# Patient Record
Sex: MALE | Employment: OTHER | URBAN - METROPOLITAN AREA
[De-identification: names, ages, dates, MRNs, and addresses within clinical notes are randomized per-mention and may not be internally consistent; named-entity substitution may affect disease eponyms.]

---

## 2023-06-21 ENCOUNTER — OFFICE VISIT (OUTPATIENT)
Dept: PRIMARY CARE | Facility: CLINIC | Age: 43
End: 2023-06-21
Payer: COMMERCIAL

## 2023-06-21 VITALS
SYSTOLIC BLOOD PRESSURE: 115 MMHG | HEART RATE: 99 BPM | TEMPERATURE: 98 F | DIASTOLIC BLOOD PRESSURE: 82 MMHG | WEIGHT: 242.4 LBS

## 2023-06-21 DIAGNOSIS — R06.83 SNORING: Primary | ICD-10-CM

## 2023-06-21 DIAGNOSIS — Z13.1 DIABETES MELLITUS SCREENING: ICD-10-CM

## 2023-06-21 DIAGNOSIS — J30.9 ALLERGIC RHINITIS, UNSPECIFIED SEASONALITY, UNSPECIFIED TRIGGER: ICD-10-CM

## 2023-06-21 DIAGNOSIS — Z13.220 SCREENING CHOLESTEROL LEVEL: ICD-10-CM

## 2023-06-21 DIAGNOSIS — M79.671 RIGHT FOOT PAIN: ICD-10-CM

## 2023-06-21 DIAGNOSIS — R63.5 ABNORMAL WEIGHT GAIN: ICD-10-CM

## 2023-06-21 PROCEDURE — 99204 OFFICE O/P NEW MOD 45 MIN: CPT | Performed by: FAMILY MEDICINE

## 2023-06-21 PROCEDURE — 1036F TOBACCO NON-USER: CPT | Performed by: FAMILY MEDICINE

## 2023-06-21 RX ORDER — ATOVAQUONE AND PROGUANIL HYDROCHLORIDE 250; 100 MG/1; MG/1
TABLET, FILM COATED ORAL
COMMUNITY
Start: 2023-04-17

## 2023-06-21 RX ORDER — FLUTICASONE PROPIONATE 50 MCG
1 SPRAY, SUSPENSION (ML) NASAL DAILY
Qty: 16 G | Refills: 5 | Status: SHIPPED | OUTPATIENT
Start: 2023-06-21 | End: 2024-06-20

## 2023-06-21 RX ORDER — CIPROFLOXACIN 500 MG/1
TABLET ORAL
COMMUNITY
Start: 2023-04-17

## 2023-06-21 NOTE — PROGRESS NOTES
Subjective   Patient ID: Saul Osman is a 42 y.o. male who presents for Establish Care.  HPI    The patient is here for the management of:  1-Snoring and apneic episodes management.  2- Right foot dorsum treatment.  3- Weight gain management.     A review of system was completed.  All systems were reviewed and were normal, except for the ones that are listed in the HPI.    Objective   Physical Exam    Assessment/Plan   Problem List Items Addressed This Visit          Respiratory    Snoring - Primary    Relevant Medications    fluticasone (Flonase) 50 mcg/actuation nasal spray    Other Relevant Orders    In-Center Sleep Study (Non-Sleep Provider Only)    TSH with reflex to Free T4 if abnormal       Musculoskeletal    Right foot pain    Relevant Orders    XR foot right 1-2 views    Referral to Podiatry       Endocrine/Metabolic    Abnormal weight gain     Other Visit Diagnoses       Allergic rhinitis, unspecified seasonality, unspecified trigger        Relevant Medications    fluticasone (Flonase) 50 mcg/actuation nasal spray    Other Relevant Orders    CBC    Comprehensive Metabolic Panel    Screening cholesterol level        Relevant Orders    Lipid Panel    Diabetes mellitus screening        Relevant Orders    Hemoglobin A1C

## 2023-06-26 ENCOUNTER — LAB (OUTPATIENT)
Dept: LAB | Facility: LAB | Age: 43
End: 2023-06-26
Payer: COMMERCIAL

## 2023-06-26 DIAGNOSIS — Z13.220 SCREENING CHOLESTEROL LEVEL: ICD-10-CM

## 2023-06-26 DIAGNOSIS — R06.83 SNORING: ICD-10-CM

## 2023-06-26 DIAGNOSIS — Z13.1 DIABETES MELLITUS SCREENING: ICD-10-CM

## 2023-06-26 DIAGNOSIS — J30.9 ALLERGIC RHINITIS, UNSPECIFIED SEASONALITY, UNSPECIFIED TRIGGER: ICD-10-CM

## 2023-06-26 LAB
ALANINE AMINOTRANSFERASE (SGPT) (U/L) IN SER/PLAS: 24 U/L (ref 10–52)
ALBUMIN (G/DL) IN SER/PLAS: 4.7 G/DL (ref 3.4–5)
ALKALINE PHOSPHATASE (U/L) IN SER/PLAS: 49 U/L (ref 33–120)
ANION GAP IN SER/PLAS: 13 MMOL/L (ref 10–20)
ASPARTATE AMINOTRANSFERASE (SGOT) (U/L) IN SER/PLAS: 22 U/L (ref 9–39)
BILIRUBIN TOTAL (MG/DL) IN SER/PLAS: 0.6 MG/DL (ref 0–1.2)
CALCIUM (MG/DL) IN SER/PLAS: 10 MG/DL (ref 8.6–10.6)
CARBON DIOXIDE, TOTAL (MMOL/L) IN SER/PLAS: 28 MMOL/L (ref 21–32)
CHLORIDE (MMOL/L) IN SER/PLAS: 104 MMOL/L (ref 98–107)
CHOLESTEROL (MG/DL) IN SER/PLAS: 190 MG/DL (ref 0–199)
CHOLESTEROL IN HDL (MG/DL) IN SER/PLAS: 45.9 MG/DL
CHOLESTEROL/HDL RATIO: 4.1
CREATININE (MG/DL) IN SER/PLAS: 0.89 MG/DL (ref 0.5–1.3)
ERYTHROCYTE DISTRIBUTION WIDTH (RATIO) BY AUTOMATED COUNT: 14 % (ref 11.5–14.5)
ERYTHROCYTE MEAN CORPUSCULAR HEMOGLOBIN CONCENTRATION (G/DL) BY AUTOMATED: 32.1 G/DL (ref 32–36)
ERYTHROCYTE MEAN CORPUSCULAR VOLUME (FL) BY AUTOMATED COUNT: 79 FL (ref 80–100)
ERYTHROCYTES (10*6/UL) IN BLOOD BY AUTOMATED COUNT: 5.55 X10E12/L (ref 4.5–5.9)
ESTIMATED AVERAGE GLUCOSE FOR HBA1C: 114 MG/DL
GFR MALE: >90 ML/MIN/1.73M2
GLUCOSE (MG/DL) IN SER/PLAS: 91 MG/DL (ref 74–99)
HEMATOCRIT (%) IN BLOOD BY AUTOMATED COUNT: 43.9 % (ref 41–52)
HEMOGLOBIN (G/DL) IN BLOOD: 14.1 G/DL (ref 13.5–17.5)
HEMOGLOBIN A1C/HEMOGLOBIN TOTAL IN BLOOD: 5.6 %
LDL: 127 MG/DL (ref 0–99)
LEUKOCYTES (10*3/UL) IN BLOOD BY AUTOMATED COUNT: 3.9 X10E9/L (ref 4.4–11.3)
NRBC (PER 100 WBCS) BY AUTOMATED COUNT: 0 /100 WBC (ref 0–0)
PLATELETS (10*3/UL) IN BLOOD AUTOMATED COUNT: 259 X10E9/L (ref 150–450)
POTASSIUM (MMOL/L) IN SER/PLAS: 4.1 MMOL/L (ref 3.5–5.3)
PROTEIN TOTAL: 7.9 G/DL (ref 6.4–8.2)
SODIUM (MMOL/L) IN SER/PLAS: 141 MMOL/L (ref 136–145)
THYROTROPIN (MIU/L) IN SER/PLAS BY DETECTION LIMIT <= 0.05 MIU/L: 2.36 MIU/L (ref 0.44–3.98)
TRIGLYCERIDE (MG/DL) IN SER/PLAS: 85 MG/DL (ref 0–149)
UREA NITROGEN (MG/DL) IN SER/PLAS: 12 MG/DL (ref 6–23)
VLDL: 17 MG/DL (ref 0–40)

## 2023-06-26 PROCEDURE — 36415 COLL VENOUS BLD VENIPUNCTURE: CPT

## 2023-06-26 PROCEDURE — 80061 LIPID PANEL: CPT

## 2023-06-26 PROCEDURE — 83036 HEMOGLOBIN GLYCOSYLATED A1C: CPT

## 2023-06-26 PROCEDURE — 85027 COMPLETE CBC AUTOMATED: CPT

## 2023-06-26 PROCEDURE — 80053 COMPREHEN METABOLIC PANEL: CPT

## 2023-06-26 PROCEDURE — 84443 ASSAY THYROID STIM HORMONE: CPT

## 2024-08-19 ENCOUNTER — TELEPHONE (OUTPATIENT)
Dept: PRIMARY CARE | Facility: CLINIC | Age: 44
End: 2024-08-19
Payer: COMMERCIAL

## 2024-08-20 ENCOUNTER — TELEMEDICINE (OUTPATIENT)
Dept: PRIMARY CARE | Facility: CLINIC | Age: 44
End: 2024-08-20
Payer: COMMERCIAL

## 2024-08-20 DIAGNOSIS — M25.562 LEFT KNEE PAIN, UNSPECIFIED CHRONICITY: Primary | ICD-10-CM

## 2024-08-20 PROCEDURE — 99214 OFFICE O/P EST MOD 30 MIN: CPT | Performed by: FAMILY MEDICINE

## 2024-08-20 NOTE — ASSESSMENT & PLAN NOTE
-Left knee x-ray ordered.  -Over-the-counter Aleve 1 to 2 tablets twice daily for 10 days then if needed recommended.  Side effects discussed.  -Referral to orthopedic surgeon if no improvement noted within 2 weeks recommended.  Referral order placed in the system today.

## 2024-08-20 NOTE — PROGRESS NOTES
Subjective   Patient ID: Saul Osman is a 43 y.o. male who presents for left knee pain.  HPI  The patient is a 43 years old -American male with no significant history who is seen today for an acute left knee pain that started about 1 to 2 weeks ago while working out and doing some squats.  The patient states that the pain is mostly present with weightbearing.  It is located on the medial and distal aspect of the left patella.  Associated with a localized mild edema.  Only partially improved with Tylenol over-the-counter.    A review of system was completed.  All systems were reviewed and were normal, except for the ones that are listed in the HPI.    Objective   Physical Exam    Assessment/Plan   Problem List Items Addressed This Visit       Left knee pain - Primary     -Left knee x-ray ordered.  -Over-the-counter Aleve 1 to 2 tablets twice daily for 10 days then if needed recommended.  Side effects discussed.  -Referral to orthopedic surgeon if no improvement noted within 2 weeks recommended.  Referral order placed in the system today.         Relevant Orders    XR knee left 3 views    Referral to Orthopaedic Surgery    Patient to return to office in 2 to 4 weeks for reassessment if no improvement noted.

## 2024-08-26 ENCOUNTER — HOSPITAL ENCOUNTER (OUTPATIENT)
Dept: RADIOLOGY | Facility: CLINIC | Age: 44
Discharge: HOME | End: 2024-08-26
Payer: COMMERCIAL

## 2024-08-26 DIAGNOSIS — M25.562 LEFT KNEE PAIN, UNSPECIFIED CHRONICITY: ICD-10-CM

## 2024-08-26 PROCEDURE — 73562 X-RAY EXAM OF KNEE 3: CPT | Mod: LEFT SIDE | Performed by: RADIOLOGY

## 2024-08-26 PROCEDURE — 73562 X-RAY EXAM OF KNEE 3: CPT | Mod: LT

## 2024-08-28 ENCOUNTER — TELEPHONE (OUTPATIENT)
Dept: PRIMARY CARE | Facility: CLINIC | Age: 44
End: 2024-08-28
Payer: COMMERCIAL

## 2024-08-29 ENCOUNTER — TELEPHONE (OUTPATIENT)
Dept: PRIMARY CARE | Facility: CLINIC | Age: 44
End: 2024-08-29
Payer: COMMERCIAL

## 2024-08-29 NOTE — TELEPHONE ENCOUNTER
The results of the x-ray is not posted in his chart yet.  Please call radiology to find out why it has not been posted yet since it was done on Monday.  Also, call the patient to inform him about what is going on.

## 2024-09-08 DIAGNOSIS — M25.562 LEFT KNEE PAIN, UNSPECIFIED CHRONICITY: Primary | ICD-10-CM

## 2024-09-10 ENCOUNTER — TELEPHONE (OUTPATIENT)
Dept: PRIMARY CARE | Facility: CLINIC | Age: 44
End: 2024-09-10

## 2024-09-10 ENCOUNTER — APPOINTMENT (OUTPATIENT)
Dept: PRIMARY CARE | Facility: CLINIC | Age: 44
End: 2024-09-10
Payer: COMMERCIAL

## 2024-10-21 ENCOUNTER — APPOINTMENT (OUTPATIENT)
Dept: RADIOLOGY | Facility: CLINIC | Age: 44
End: 2024-10-21
Payer: COMMERCIAL

## 2024-10-29 ENCOUNTER — HOSPITAL ENCOUNTER (OUTPATIENT)
Dept: RADIOLOGY | Facility: CLINIC | Age: 44
Discharge: HOME | End: 2024-10-29
Payer: COMMERCIAL

## 2024-10-29 DIAGNOSIS — M25.562 LEFT KNEE PAIN, UNSPECIFIED CHRONICITY: ICD-10-CM

## 2024-10-29 PROCEDURE — 73721 MRI JNT OF LWR EXTRE W/O DYE: CPT | Mod: LT

## 2024-10-29 PROCEDURE — 73721 MRI JNT OF LWR EXTRE W/O DYE: CPT | Mod: LEFT SIDE | Performed by: STUDENT IN AN ORGANIZED HEALTH CARE EDUCATION/TRAINING PROGRAM

## 2024-10-30 ENCOUNTER — TELEPHONE (OUTPATIENT)
Dept: PRIMARY CARE | Facility: CLINIC | Age: 44
End: 2024-10-30
Payer: COMMERCIAL

## 2024-10-30 DIAGNOSIS — M25.562 LEFT KNEE PAIN, UNSPECIFIED CHRONICITY: Primary | ICD-10-CM

## 2024-11-19 ENCOUNTER — OFFICE VISIT (OUTPATIENT)
Dept: ORTHOPEDIC SURGERY | Facility: CLINIC | Age: 44
End: 2024-11-19
Payer: COMMERCIAL

## 2024-11-19 DIAGNOSIS — M25.562 LEFT KNEE PAIN, UNSPECIFIED CHRONICITY: ICD-10-CM

## 2024-11-19 PROCEDURE — 1036F TOBACCO NON-USER: CPT | Performed by: PHYSICIAN ASSISTANT

## 2024-11-19 PROCEDURE — 99203 OFFICE O/P NEW LOW 30 MIN: CPT | Performed by: PHYSICIAN ASSISTANT

## 2024-11-19 PROCEDURE — 99213 OFFICE O/P EST LOW 20 MIN: CPT | Performed by: PHYSICIAN ASSISTANT

## 2024-11-19 ASSESSMENT — PAIN - FUNCTIONAL ASSESSMENT: PAIN_FUNCTIONAL_ASSESSMENT: NO/DENIES PAIN

## 2024-11-19 NOTE — PROGRESS NOTES
History of Present Illness  Saul Osman is a 44 y.o.s male presenting for evaluation of side: left knee pain for the past  few  months. Sx started with  exercising, specifically squatting/weight lifting,  and localized to left medial knee. Described as mild. Since increased with squatting. Improved with relaxation and ice. has not had similar sx in the past. has not tried therapy for the pain. Denies trauma/fall. Denies numbness, tingling, f/c, n/v, CP, SOB, or any other complaints/concerns.      History reviewed. No pertinent past medical history.    Medication Documentation Review Audit       Reviewed by Sharif Fournier on 24 at 0931      Medication Order Taking? Sig Documenting Provider Last Dose Status   atovaquone-proguaniL (Malarone) 250-100 mg tablet [id number]  PLEASE SEE ATTACHED FOR DETAILED DIRECTIONS Historical Provider, MD  Active   ciprofloxacin (Cipro) 500 mg tablet [id number]  TAKE 1 TABLET BY MOUTH TWICE A DAY AS NEEDED FOR TRAVELER'S DIARRHEA Historical Provider, MD  Active   fluticasone (Flonase) 50 mcg/actuation nasal spray [id number]  Administer 1 spray into each nostril once daily. Shake gently. Before first use, prime pump. After use, clean tip and replace cap. Priyanka Toth MD   [phone]                    No Known Allergies    Social History     Socioeconomic History    Marital status: Single     Spouse name: Not on file    Number of children: Not on file    Years of education: Not on file    Highest education level: Not on file   Occupational History    Not on file   Tobacco Use    Smoking status: Never    Smokeless tobacco: Never   Vaping Use    Vaping status: Never Used   Substance and Sexual Activity    Alcohol use: Not on file    Drug use: Not on file    Sexual activity: Not on file   Other Topics Concern    Not on file   Social History Narrative    Not on file     Social Drivers of Health     Financial Resource Strain: Not on file   Food Insecurity: Not on file    Transportation Needs: Not on file   Physical Activity: Not on file   Stress: Not on file   Social Connections: Not on file   Intimate Partner Violence: Not on file   Housing Stability: Not on file       Past Surgical History:   Procedure Laterality Date    HERNIA REPAIR          Review of Systems   30 point ROS reviewed and negative other than as listed in the HPI.      Exam  Gen: The pt is A&Ox3, NAD, and appear state age and weight  Psychiatric: mood and affect are appropriate   Eyes: sclera are white, EOM grossly intact  ENT: MMM  Neck: supple, thyroid is midline  Respiratory: respirations are nonlabored, chest rise symmetric  CV: rate is regular by palpation of distal pulses  Abdomen: nondistended   Integument: no obvious cutaneous lesions noted. No signs of lymphangitis. No signs of systemic edema.   MSK:  Musculoskeletal examination of the left knee demonstrate no abrasions and no effusion. Knee range of motion is painful but demonstrates full extension to beyond 90° of flexion. The knee is stable to varus and valgus stress at zero and 30° of flexion. Lachman's test and posterior drawer are negative. Medial and lateral Juliet's tests are negative. Patellar apprehension is negative. Patellar grind test is negative. Sensation is intact to light touch in the tibial, sural, saphenous, superficial peroneal, and deep peroneal nerve distributions. Foot is warm and well-perfused.     Imaging:  I personally reviewed multiple views of the L knee with recent XR and MRI which is notable for cartilage defect in the medial femoral condyle     Assessment:  left patellofemoral syndrome    Plan:  We discussed conservative treatment with Natural history and expected course discussed. Questions answered.  Rest, ice, compression, and elevation (RICE) therapy.  Reduction in offending activity.  Patellar compression sleeve.  PT referral.     Follow up sports medicine in 6-8 weeks if pain not resolved with therapy.     Natural  history reviewed. All of the pt questions/concerns addressed and they are in agreement with the plan.

## 2024-12-10 ENCOUNTER — TELEMEDICINE (OUTPATIENT)
Dept: PRIMARY CARE | Facility: CLINIC | Age: 44
End: 2024-12-10
Payer: COMMERCIAL

## 2024-12-10 DIAGNOSIS — L70.9 ADULT ACNE: ICD-10-CM

## 2024-12-10 DIAGNOSIS — L81.9 HYPERPIGMENTATION: Primary | ICD-10-CM

## 2024-12-10 PROCEDURE — 99213 OFFICE O/P EST LOW 20 MIN: CPT | Performed by: FAMILY MEDICINE

## 2024-12-10 NOTE — PROGRESS NOTES
Subjective   Patient ID: Saul Osman is a 44 y.o. male who presents for     HPI    The patient is a 44 years old -American male with no significant history who is seen today for a referral to a dermatologist.  He is concerned because he has recently developed an apnea and some mild facial skin hyperpigmentation related to the acne.    A review of system was completed.  All systems were reviewed and were normal, except for the ones that are listed in the HPI.    Objective   Physical Exam    Assessment/Plan   Problem List Items Addressed This Visit       Hyperpigmentation - Primary    Relevant Orders    Referral to Dermatology    Adult acne     -Dermatology referral done today.         Relevant Orders    Referral to Dermatology    Patient to return to office for his yearly physical exam

## 2025-01-06 ENCOUNTER — EVALUATION (OUTPATIENT)
Dept: PHYSICAL THERAPY | Facility: CLINIC | Age: 45
End: 2025-01-06
Payer: COMMERCIAL

## 2025-01-06 DIAGNOSIS — M25.562 LEFT KNEE PAIN, UNSPECIFIED CHRONICITY: ICD-10-CM

## 2025-01-06 PROCEDURE — 97161 PT EVAL LOW COMPLEX 20 MIN: CPT | Mod: GP | Performed by: PHYSICAL THERAPIST

## 2025-01-06 PROCEDURE — 97110 THERAPEUTIC EXERCISES: CPT | Mod: GP | Performed by: PHYSICAL THERAPIST

## 2025-01-06 ASSESSMENT — PAIN - FUNCTIONAL ASSESSMENT: PAIN_FUNCTIONAL_ASSESSMENT: 0-10

## 2025-01-06 ASSESSMENT — PAIN SCALES - GENERAL: PAINLEVEL_OUTOF10: 0 - NO PAIN

## 2025-01-06 NOTE — PROGRESS NOTES
Physical Therapy  Physical Therapy Orthopedic Evaluation    Patient Name: Saul Osman  MRN: [id number]  Today's Date: 1/6/2025  Time Calculation  Start Time: 1315  Stop Time: 1400  Time Calculation (min): 45 min  Reason for visit: Left knee pain (PFS)   Referring MD: Megan Wilson  Insurance: 2025 BENEFIT / NO AUTH / 20% COINS / 60 VISITS / $700 DED / $6000 OOP / AETNA   DX:       Current Problem  1. Left knee pain, unspecified chronicity  Referral to Physical Therapy    Follow Up In Physical Therapy          General:  General  Reason for Referral: Left knee pain PFS  Referred By: Megan Wilson PA-C  Precautions:  Precautions  STEADI Fall Risk Score (The score of 4 or more indicates an increased risk of falling): 0  Pain:  Pain Assessment: 0-10  0-10 (Numeric) Pain Score: 0 - No pain    Subjective:     Subjective   Chief Complaint: left knee pain started towards the end of last year when I was working out.  Reports that he layed off exercise and pain has gotten better.  Does not feel as stable doing things like going up the stairs and avoids exercises wher he has to bend his knee.    Onset:   SYLVIA:     Current Condition: improving    PAIN  Intensity (0-10): 0-2/10  Location: front of left knee   Description: pressure/soreness , impinging     Aggravating Factors:  squating , stairs, bending knee,  Relieving Factors:  rest, avoiding painful activities     Relevant Information (PMH & Previous Tests/Imaging):     Xray left knee :  FINDINGS:   No fracture or dislocation is evident.  There is a small volume knee   joint effusion. There is a superior patellar enthesophyte.  No soft   tissue gas or radiopaque foreign body is evident.     MRI :  IMPRESSION:  Areas of high-grade articular cartilage defects involving the medial  femoral condyle and femoral trochlea. There are suspected articular  cartilage fragments in the popliteus tendon sheath.    Previous Interventions/Treatments: none     Prior Level of Function  "(PLOF)  Exercise/Physical Activity: was going to gym at home 3 times per week  Work/School:Full Time   run tech company   Living situation/Environment: no problems reported in apartment     Treatment Goals: wants to be able to reduce pressure/pain, feeling of knee not being stable     Red Flags: Do you have any of the following? No   Fever/chills, unexplained weight changes, dizziness/fainting, unexplained change in bowel or bladder functions, unexplained malaise or muscle weakness, night pain/sweats, numbness or tingling  Medical history reviewed:  yes (scanned in chart)    Objective:  Objective       Observation/Posture: lat patellar tracking herb L>R   Gait: non antalgic     Transfers:  sit <> stand: independent  sup <> sit: independent  bed mobility: independent    Knee ROM        Left knee: Ext= 0 Flex= 127  Right knee: Ext= 0 Flex= 128    Knee Strength        Extension: L= 5-/5 [] R= 5/5  Flexion: L= 5/5 [] R= 5/5     Hip Strength MMT  Flex: L= 4+/5 [] R= 4+/5   Abd L= 4/5 [] R= 4+/5  ADD 5/5 herb   Ext L= 4/5 [] R= 4/5    Hams evna dec  IT band MOD Dec   Quads 7\" right , 10\" left       Outcome Measures:  Other Measures  Lower Extremity Funtional Score (LEFS): 57/80     EDUCATION: home exercise program, plan of care, activity modifications, pain management, and injury pathology       Goals:  Active       PT Problem       PT Goal 1       Start:  01/06/25    Expected End:  02/03/25       Patient will demonstrate good understanding and compliance with HEP   Decrease pain to  0-1/10          PT Goal 2       Start:  01/06/25    Expected End:  03/03/25       Single leg dip w/o valgus collapse  Squat full range with good form and no pain   Will ascend/descend stairs with normalized pattern w/o pain   Hip strength 5-/5 or better to allow return to prior level of function   Knee strength 5/5   Increase LEFS score by 9 points              Treatments:   Access Code: [email or web address]  URL: " [email or web address]  Date: 01/06/2025  Prepared by: Colten Lopez    Exercises  - Seated Hamstring Stretch  - 2-3 x daily - 3-4 reps - 30 hold  - ITB Stretch at Wall  - 2-3 x daily - 3-4 reps - 30 hold  - Quadriceps Stretch with Chair  - 2-3 x daily - 3-4 reps - 30 hold  - Small Range Straight Leg Raise  - 1-2 x daily - 2-3 sets - 10 reps  - Sidelying Hip Abduction  - 1-2 x daily - 2-3 sets - 10 reps  - Prone Hip Extension  - 1-2 x daily - 2-3 sets - 10 reps    Assessment: Patient presents with signs and symptoms consistent with left knee PFS , resulting in limited participation in pain-free ADLs and inability to perform at their prior level of function. Pt would benefit from physical therapy to address the impairments found & listed previously in the objective section in order to return to safe and pain-free ADLs and prior level of function.     Pain, Impaired stair negotiation , Impaired transfers, Decreased flexibility, Decreased strength, Limitations to normal ADL's, and Decreased knowledge of HEP     Clinical presentation:  Stable   Level of Complexity low     Plan:     Planned Interventions include: therapeutic exercise, self-care home management, manual therapy, therapeutic activities, gait training, neuromuscular coordination, aquatic therapy, modalities PRN  Frequency: 1 /week  or every other week   Duration: 4-6 visits     Colten Lopez, PT

## 2025-01-08 ENCOUNTER — APPOINTMENT (OUTPATIENT)
Dept: PHYSICAL THERAPY | Facility: CLINIC | Age: 45
End: 2025-01-08
Payer: COMMERCIAL

## 2025-01-15 ENCOUNTER — DOCUMENTATION (OUTPATIENT)
Dept: PHYSICAL THERAPY | Facility: CLINIC | Age: 45
End: 2025-01-15
Payer: COMMERCIAL

## 2025-01-15 NOTE — PROGRESS NOTES
Physical Therapy  Patient No-Show Documentation       Saul Osman no showed for their visit on 1/15/2025. This is the 1st occurrence.     Colten Lopez, PT

## 2025-01-22 ENCOUNTER — TREATMENT (OUTPATIENT)
Dept: PHYSICAL THERAPY | Facility: CLINIC | Age: 45
End: 2025-01-22
Payer: COMMERCIAL

## 2025-01-22 DIAGNOSIS — M25.562 LEFT KNEE PAIN, UNSPECIFIED CHRONICITY: Primary | ICD-10-CM

## 2025-01-22 PROCEDURE — 97110 THERAPEUTIC EXERCISES: CPT | Mod: GP | Performed by: PHYSICAL THERAPIST

## 2025-01-22 ASSESSMENT — PAIN SCALES - GENERAL: PAINLEVEL_OUTOF10: 1

## 2025-01-22 ASSESSMENT — PAIN - FUNCTIONAL ASSESSMENT: PAIN_FUNCTIONAL_ASSESSMENT: 0-10

## 2025-01-22 NOTE — PROGRESS NOTES
"Physical Therapy  Physical Therapy Treatment    Patient Name: Saul Osman  MRN: [id number]  Today's Date: 1/22/2025  Time Calculation  Start Time: 0700  Stop Time: 0740  Time Calculation (min): 40 min    Visit # 2   Insurance: 2025 BENEFIT / NO AUTH / 20% COINS / 60 VISITS / $700 DED / $6000 OOP / AETNA   DX: left knee pain unspecified chronicity    Current Problem  1. Left knee pain, unspecified chronicity            General  Reason for Referral: Left knee pain PFS  Referred By: Megan Wilson PA-C  Precautions  Precautions  STEADI Fall Risk Score (The score of 4 or more indicates an increased risk of falling): 0  Pain  Pain Assessment: 0-10  0-10 (Numeric) Pain Score: 1    Subjective:   Subjective   Patient reports knee is ok.  Current pain 1/10, and HEP is going OK.  I had the flu last week so did not do as much as I wanted.    HEP compliance-  yes     Objective:   Objective   Knee ROM                                                                     Left knee: Ext= 0 Flex= 127  Right knee: Ext= 0 Flex= 128     Knee Strength                                                                Extension:L= 5-/5 [] R= 5/5  Flexion:L= 5/5 [] R= 5/5      Hip Strength MMT  Flex:L= 4+/5 [] R= 4+/5   AbdL= 4/5 [] R= 4+/5  ADD 5/5 du   ExtL= 4/5 [] R= 4/5     Hams evan dec  IT band MOD Dec   Quads 7\" right , 10\" left     Treatments:  Bike L3 x5'   SLR 2x15  Side hip abd 2x15   Prone hip ext 2x15   GTB squat w/ chair touch 2x10 (Added to HEP)    GTB inch worm 20' x3 laps (Added to HEP)  Ham stretch long sit 1' du   Quad stretch stand DU 1'   IT band stretch at wall 1' du          Access Code: [email or web address]     Charges: 3 TE     - PT Therapeutic Procedures Time Entry  Therapeutic Exercise Time Entry: 40 -       Assessment:    Tolerated session well without reports of pain, but did state that left knee felt less stable than right.      Plan:    Continue per POC to increase flexibility, strength and stability to decrease pain and " allow return to PLOF.        Colten Lopez, PT

## 2025-02-07 ENCOUNTER — APPOINTMENT (OUTPATIENT)
Dept: PHYSICAL THERAPY | Facility: CLINIC | Age: 45
End: 2025-02-07
Payer: COMMERCIAL

## 2025-02-17 ENCOUNTER — APPOINTMENT (OUTPATIENT)
Dept: PHYSICAL THERAPY | Facility: CLINIC | Age: 45
End: 2025-02-17
Payer: COMMERCIAL

## 2025-02-25 ENCOUNTER — TREATMENT (OUTPATIENT)
Dept: PHYSICAL THERAPY | Facility: CLINIC | Age: 45
End: 2025-02-25
Payer: COMMERCIAL

## 2025-02-25 DIAGNOSIS — M25.562 LEFT KNEE PAIN, UNSPECIFIED CHRONICITY: ICD-10-CM

## 2025-02-25 PROCEDURE — 97110 THERAPEUTIC EXERCISES: CPT | Mod: GP | Performed by: PHYSICAL THERAPIST

## 2025-02-25 NOTE — PROGRESS NOTES
"Physical Therapy  Physical Therapy Treatment    Patient Name: Saul Osman  MRN: [id number]  Today's Date: 2/25/2025       Visit # 3   Insurance: 2025 BENEFIT / NO AUTH / 20% COINS / 60 VISITS / $700 DED / $6000 OOP / AETNA   DX: left knee pain unspecified chronicity    Current Problem  1. Left knee pain, unspecified chronicity  Follow Up In Physical Therapy          General     Precautions     Pain       Subjective:   Subjective   Patient reports knee is ok without current pain.  Only thing that hurts is going up stairs.        HEP compliance-  yes     Objective:   Objective   Knee ROM                                                                     Left knee: Ext= 0 Flex= 127  Right knee: Ext= 0 Flex= 128     Knee Strength                                                                Extension: L= 5-/5 [] R= 5/5  Flexion: L= 5/5 [] R= 5/5      Hip Strength MMT  Flex: L= 5-/5 [] R= 5-/5   Abd L= 4+/5 [] R= 4+/5  ADD 5/5 du   Ext L= 4/5 [] R= 4/5     Hams evan dec  IT band MOD Dec   Quads 7\" right , 10\" left     Treatments:  Bike L3 x5' (seat 4)   Bridge BTB at knee 2x10 (Added to HEP)    BTB squat w/ chair touch 2x10   BTB hip ext / flexion 2x10 du (Added to HEP)    BTB inch worm 20' x3 laps   Lat dips 6\" .20 du (Added to HEP)    SLS 4kg kb x10 cw/ccw   DBE 2' x2 way   Ham stretch long sit 1' du   Quad stretch stand DU 1'   IT band stretch at wall 1' du  - in HEP          Access Code: [email or web address]     Charges: 3 TE     -   -       Assessment:    Improvements noted in all hip strength except extension.  Cues to avoid trunk lean during hip strengthening exercises.      Plan:    Continue per POC to increase flexibility, strength and stability to decrease pain and allow return to PLOF.        Colten Lopez, PT  "

## 2025-03-04 ENCOUNTER — DOCUMENTATION (OUTPATIENT)
Dept: PHYSICAL THERAPY | Facility: CLINIC | Age: 45
End: 2025-03-04
Payer: COMMERCIAL

## 2025-03-04 NOTE — PROGRESS NOTES
Physical Therapy  Patient No-Show Documentation       Saul Osman no showed for their visit on 3/4/2025. This is the 2nd occurrence. Attempted to call patient but no answer, unable to leave message.        Colten Lopez, PT

## 2025-03-17 ENCOUNTER — DOCUMENTATION (OUTPATIENT)
Dept: PHYSICAL THERAPY | Facility: CLINIC | Age: 45
End: 2025-03-17
Payer: COMMERCIAL

## 2025-03-17 NOTE — PROGRESS NOTES
Physical Therapy  Patient No-Show Documentation and discharge        Saul Osman no showed for their visit on 3/17/2025. This is the 3rd occurrence. Patient is discharged at this time due to chronic no-show/cancellation policy.      Colten Lopez, PT

## 2025-03-31 ENCOUNTER — APPOINTMENT (OUTPATIENT)
Dept: PHYSICAL THERAPY | Facility: CLINIC | Age: 45
End: 2025-03-31
Payer: COMMERCIAL

## 2025-06-09 ENCOUNTER — APPOINTMENT (OUTPATIENT)
Dept: PRIMARY CARE | Facility: CLINIC | Age: 45
End: 2025-06-09
Payer: COMMERCIAL

## 2025-06-09 VITALS
TEMPERATURE: 98.2 F | SYSTOLIC BLOOD PRESSURE: 125 MMHG | DIASTOLIC BLOOD PRESSURE: 93 MMHG | HEART RATE: 96 BPM | WEIGHT: 241 LBS

## 2025-06-09 DIAGNOSIS — R06.83 PRIMARY SNORING: ICD-10-CM

## 2025-06-09 DIAGNOSIS — Z13.220 SCREENING CHOLESTEROL LEVEL: ICD-10-CM

## 2025-06-09 DIAGNOSIS — R06.83 SNORING: ICD-10-CM

## 2025-06-09 DIAGNOSIS — Z12.5 PROSTATE CANCER SCREENING: ICD-10-CM

## 2025-06-09 DIAGNOSIS — Z23 IMMUNIZATION DUE: ICD-10-CM

## 2025-06-09 DIAGNOSIS — Z13.1 DIABETES MELLITUS SCREENING: ICD-10-CM

## 2025-06-09 DIAGNOSIS — Z00.00 HEALTH CARE MAINTENANCE: Primary | ICD-10-CM

## 2025-06-09 PROBLEM — Z12.11 COLON CANCER SCREENING: Status: ACTIVE | Noted: 2025-06-09

## 2025-06-09 PROCEDURE — 1036F TOBACCO NON-USER: CPT | Performed by: FAMILY MEDICINE

## 2025-06-09 PROCEDURE — 99396 PREV VISIT EST AGE 40-64: CPT | Performed by: FAMILY MEDICINE

## 2025-06-09 NOTE — PROGRESS NOTES
Subjective   Patient ID: Saul Osman is a 44 y.o. male who presents for Referral.  HPI  The patient is a 45 yo Male with no significant history, here for:    1- CPE  -blood test ordered.  -no 1st degree colon cancer.   -immunizations: N/A.    2- evaluation of possible sleep apnea.  The patient snores and states that he occasionally feels paralysed when he wakes up.  No family member is diagnosed with MASSIEL.    A review of system was completed.  All systems were reviewed and were normal, except for the ones that are listed in the HPI.    Objective   Physical Exam  Constitutional:       Appearance: Normal appearance.   HENT:      Head: Normocephalic and atraumatic.      Right Ear: Tympanic membrane, ear canal and external ear normal.      Left Ear: Tympanic membrane, ear canal and external ear normal.      Nose: Nose normal.      Mouth/Throat:      Mouth: Mucous membranes are moist.      Pharynx: Oropharynx is clear.   Eyes:      Extraocular Movements: Extraocular movements intact.      Conjunctiva/sclera: Conjunctivae normal.      Pupils: Pupils are equal, round, and reactive to light.   Cardiovascular:      Rate and Rhythm: Normal rate and regular rhythm.      Pulses: Normal pulses.   Pulmonary:      Effort: Pulmonary effort is normal.      Breath sounds: Normal breath sounds.   Abdominal:      General: Abdomen is flat. Bowel sounds are normal.      Palpations: Abdomen is soft.   Musculoskeletal:         General: Normal range of motion.      Cervical back: Normal range of motion and neck supple.   Skin:     General: Skin is warm.   Neurological:      General: No focal deficit present.      Mental Status: He is alert and oriented to person, place, and time. Mental status is at baseline.   Psychiatric:         Mood and Affect: Mood normal.         Behavior: Behavior normal.         Thought Content: Thought content normal.         Judgment: Judgment normal.     Assessment/Plan   Problem List Items Addressed This Visit        Snoring    -Sleep study referral.  -Sleep medicine referral.           Health care maintenance - Primary    -blood test ordered.  -no 1st degree colon cancer.   -immunizations: N/A.         Relevant Orders    CBC    Comprehensive Metabolic Panel    Hemoglobin A1C    Lipid Panel    TSH with reflex to Free T4 if abnormal    PSA, Total and Free    Screening cholesterol level    Relevant Orders    Lipid Panel    Diabetes mellitus screening    Relevant Orders    Hemoglobin A1C    Immunization due    Prostate cancer screening    Relevant Orders    PSA, Total and Free    Primary snoring    Relevant Orders    In-Center Sleep Study (Non-Sleep Provider)    Referral to Adult Sleep Medicine    Patient to return to office in 1 year for your next CPE.

## 2025-06-10 LAB
ALBUMIN SERPL-MCNC: 4.5 G/DL (ref 3.6–5.1)
ALP SERPL-CCNC: 51 U/L (ref 36–130)
ALT SERPL-CCNC: 24 U/L (ref 9–46)
ANION GAP SERPL CALCULATED.4IONS-SCNC: 7 MMOL/L (CALC) (ref 7–17)
AST SERPL-CCNC: 17 U/L (ref 10–40)
BILIRUB SERPL-MCNC: 0.4 MG/DL (ref 0.2–1.2)
BUN SERPL-MCNC: 12 MG/DL (ref 7–25)
CALCIUM SERPL-MCNC: 9.7 MG/DL (ref 8.6–10.3)
CHLORIDE SERPL-SCNC: 101 MMOL/L (ref 98–110)
CHOLEST SERPL-MCNC: 205 MG/DL
CHOLEST/HDLC SERPL: 3.9 (CALC)
CO2 SERPL-SCNC: 30 MMOL/L (ref 20–32)
CREAT SERPL-MCNC: 0.94 MG/DL (ref 0.6–1.29)
EGFRCR SERPLBLD CKD-EPI 2021: 104 ML/MIN/1.73M2
ERYTHROCYTE [DISTWIDTH] IN BLOOD BY AUTOMATED COUNT: 14.3 % (ref 11–15)
EST. AVERAGE GLUCOSE BLD GHB EST-MCNC: 114 MG/DL
EST. AVERAGE GLUCOSE BLD GHB EST-SCNC: 6.3 MMOL/L
GLUCOSE SERPL-MCNC: 95 MG/DL (ref 65–99)
HBA1C MFR BLD: 5.6 %
HCT VFR BLD AUTO: 44.6 % (ref 38.5–50)
HDLC SERPL-MCNC: 53 MG/DL
HGB BLD-MCNC: 14.3 G/DL (ref 13.2–17.1)
LDLC SERPL CALC-MCNC: 136 MG/DL (CALC)
MCH RBC QN AUTO: 25.7 PG (ref 27–33)
MCHC RBC AUTO-ENTMCNC: 32.1 G/DL (ref 32–36)
MCV RBC AUTO: 80.2 FL (ref 80–100)
NONHDLC SERPL-MCNC: 152 MG/DL (CALC)
PLATELET # BLD AUTO: 261 THOUSAND/UL (ref 140–400)
PMV BLD REES-ECKER: 10.3 FL (ref 7.5–12.5)
POTASSIUM SERPL-SCNC: 4.2 MMOL/L (ref 3.5–5.3)
PROT SERPL-MCNC: 7.6 G/DL (ref 6.1–8.1)
PSA FREE MFR SERPL: 40 % (CALC)
PSA FREE SERPL-MCNC: 0.2 NG/ML
PSA SERPL-MCNC: 0.5 NG/ML
RBC # BLD AUTO: 5.56 MILLION/UL (ref 4.2–5.8)
SODIUM SERPL-SCNC: 138 MMOL/L (ref 135–146)
TRIGL SERPL-MCNC: 69 MG/DL
TSH SERPL-ACNC: 3.05 MIU/L (ref 0.4–4.5)
WBC # BLD AUTO: 3.6 THOUSAND/UL (ref 3.8–10.8)

## 2025-06-12 ENCOUNTER — TELEPHONE (OUTPATIENT)
Dept: PRIMARY CARE | Facility: CLINIC | Age: 45
End: 2025-06-12
Payer: COMMERCIAL

## 2025-07-16 ENCOUNTER — TELEPHONE (OUTPATIENT)
Dept: PRIMARY CARE | Facility: CLINIC | Age: 45
End: 2025-07-16
Payer: COMMERCIAL